# Patient Record
Sex: FEMALE | URBAN - METROPOLITAN AREA
[De-identification: names, ages, dates, MRNs, and addresses within clinical notes are randomized per-mention and may not be internally consistent; named-entity substitution may affect disease eponyms.]

---

## 2021-08-20 ENCOUNTER — OFFICE VISIT (OUTPATIENT)
Dept: URBAN - METROPOLITAN AREA CLINIC 29 | Facility: CLINIC | Age: 81
End: 2021-08-20
Payer: COMMERCIAL

## 2021-08-20 DIAGNOSIS — H40.2231 CHRONIC ANGLE-CLOSURE GLAUCOMA, BILATERAL, MILD STAGE: Primary | ICD-10-CM

## 2021-08-20 PROCEDURE — 92133 CPTRZD OPH DX IMG PST SGM ON: CPT | Performed by: OPHTHALMOLOGY

## 2021-08-20 PROCEDURE — 99204 OFFICE O/P NEW MOD 45 MIN: CPT | Performed by: OPHTHALMOLOGY

## 2021-08-20 RX ORDER — BIMATOPROST 0.1 MG/ML
0.01 % SOLUTION/ DROPS OPHTHALMIC
Qty: 2.5 | Refills: 0 | Status: INACTIVE
Start: 2021-08-20 | End: 2021-09-17

## 2021-08-20 ASSESSMENT — INTRAOCULAR PRESSURE
OD: 12
OS: 18

## 2021-08-20 NOTE — IMPRESSION/PLAN
Impression: Chronic angle-closure glaucoma, bilateral, mild stage: D59.6644. Trab OS Shunt OS Plan: Discussed diagnosis, explained and understood by patient. Discussed IOP/ONH/Glaucoma management and risks. OCT ordered and reviewed today. Continue Combigan BID OU and Dorzolamide BID OU. Stop Vyzulta, start Lumigan QHS OS only. Sample given. Discussed side effects of drops. Will continue to monitor condition and symptoms.

## 2021-09-17 ENCOUNTER — OFFICE VISIT (OUTPATIENT)
Dept: URBAN - METROPOLITAN AREA CLINIC 29 | Facility: CLINIC | Age: 81
End: 2021-09-17
Payer: COMMERCIAL

## 2021-09-17 DIAGNOSIS — H40.2222 CHRONIC ANGLE-CLOSURE GLAUCOMA, LEFT EYE, MODERATE STAGE: Primary | ICD-10-CM

## 2021-09-17 DIAGNOSIS — H40.2211 CHRONIC ANGLE-CLOSURE GLAUCOMA, RIGHT EYE, MILD STAGE: ICD-10-CM

## 2021-09-17 PROCEDURE — 99213 OFFICE O/P EST LOW 20 MIN: CPT | Performed by: OPHTHALMOLOGY

## 2021-09-17 RX ORDER — BIMATOPROST 0.1 MG/ML
0.01 % SOLUTION/ DROPS OPHTHALMIC
Qty: 2.5 | Refills: 0 | Status: INACTIVE
Start: 2021-09-17 | End: 2021-11-24

## 2021-09-17 RX ORDER — DORZOLAMIDE HCL 20 MG/ML
2 % SOLUTION/ DROPS OPHTHALMIC
Qty: 20 | Refills: 4 | Status: ACTIVE
Start: 2021-09-17

## 2021-09-17 RX ORDER — BRIMONIDINE TARTRATE, TIMOLOL MALEATE 2; 5 MG/ML; MG/ML
SOLUTION/ DROPS OPHTHALMIC
Qty: 20 | Refills: 4 | Status: ACTIVE
Start: 2021-09-17

## 2021-09-17 ASSESSMENT — INTRAOCULAR PRESSURE
OD: 11
OS: 14

## 2021-09-17 NOTE — IMPRESSION/PLAN
Impression: Chronic angle-closure glaucoma, left eye, moderate stage: Y10.6231. Hx of laser OU not effective Trab OS Shunt OS Enlarged cupping OU IOP stable OU 
vyzulta not effective Plan: Discussed diagnosis, explained and understood by patient. Discussed IOP/ONH/Glaucoma management and risks. Continue Combigan BID OS and Dorzolamide BID OS. Continue Lumigan QHS OS, IOP effectively lowered with lumigan. Will continue to monitor condition and symptoms. Patient requested to be seen closer to home, recommended Dr. Monroe Hernandez. See Dr. Ilah Cheadle in 4mths.